# Patient Record
Sex: MALE | Race: BLACK OR AFRICAN AMERICAN | NOT HISPANIC OR LATINO | URBAN - METROPOLITAN AREA
[De-identification: names, ages, dates, MRNs, and addresses within clinical notes are randomized per-mention and may not be internally consistent; named-entity substitution may affect disease eponyms.]

---

## 2022-04-20 ENCOUNTER — OFFICE (OUTPATIENT)
Dept: URBAN - METROPOLITAN AREA CLINIC 11 | Facility: CLINIC | Age: 22
End: 2022-04-20

## 2022-04-20 VITALS
OXYGEN SATURATION: 100 % | HEART RATE: 74 BPM | HEIGHT: 66 IN | WEIGHT: 131 LBS | SYSTOLIC BLOOD PRESSURE: 113 MMHG | DIASTOLIC BLOOD PRESSURE: 63 MMHG

## 2022-04-20 DIAGNOSIS — R11.2 NAUSEA WITH VOMITING, UNSPECIFIED: ICD-10-CM

## 2022-04-20 DIAGNOSIS — R10.13 EPIGASTRIC PAIN: ICD-10-CM

## 2022-04-20 LAB
AMYLASE: 28 U/L — LOW (ref 31–110)
CBC, PLATELET, NO DIFFERENTIAL: HEMATOCRIT: 42 % (ref 34–46.6)
CBC, PLATELET, NO DIFFERENTIAL: HEMOGLOBIN: 12.3 G/DL (ref 11.1–15.9)
CBC, PLATELET, NO DIFFERENTIAL: MCH: 22 PG — LOW (ref 26.6–33)
CBC, PLATELET, NO DIFFERENTIAL: MCHC: 29.3 G/DL — LOW (ref 31.5–35.7)
CBC, PLATELET, NO DIFFERENTIAL: MCV: 75 FL — LOW (ref 79–97)
CBC, PLATELET, NO DIFFERENTIAL: PLATELETS: 544 X10E3/UL — HIGH (ref 150–450)
CBC, PLATELET, NO DIFFERENTIAL: RBC: 5.6 X10E6/UL — HIGH (ref 3.77–5.28)
CBC, PLATELET, NO DIFFERENTIAL: RDW: 14.3 % (ref 11.7–15.4)
CBC, PLATELET, NO DIFFERENTIAL: WBC: 8.4 X10E3/UL (ref 3.4–10.8)
COMP. METABOLIC PANEL (14): A/G RATIO: 1.4 (ref 1.2–2.2)
COMP. METABOLIC PANEL (14): ALBUMIN: 4 G/DL (ref 3.9–5)
COMP. METABOLIC PANEL (14): ALKALINE PHOSPHATASE: 94 IU/L (ref 44–121)
COMP. METABOLIC PANEL (14): ALT (SGPT): 10 IU/L (ref 0–32)
COMP. METABOLIC PANEL (14): AST (SGOT): 12 IU/L (ref 0–40)
COMP. METABOLIC PANEL (14): BILIRUBIN, TOTAL: 0.5 MG/DL (ref 0–1.2)
COMP. METABOLIC PANEL (14): BUN/CREATININE RATIO: 10 (ref 9–23)
COMP. METABOLIC PANEL (14): BUN: 9 MG/DL (ref 6–20)
COMP. METABOLIC PANEL (14): CALCIUM: 9.7 MG/DL (ref 8.7–10.2)
COMP. METABOLIC PANEL (14): CARBON DIOXIDE, TOTAL: 27 MMOL/L (ref 20–29)
COMP. METABOLIC PANEL (14): CHLORIDE: 98 MMOL/L (ref 96–106)
COMP. METABOLIC PANEL (14): CREATININE: 0.89 MG/DL (ref 0.57–1)
COMP. METABOLIC PANEL (14): EGFR: 95 ML/MIN/1.73 (ref 59–?)
COMP. METABOLIC PANEL (14): GLOBULIN, TOTAL: 2.9 G/DL (ref 1.5–4.5)
COMP. METABOLIC PANEL (14): GLUCOSE: 81 MG/DL (ref 65–99)
COMP. METABOLIC PANEL (14): POTASSIUM: 4.5 MMOL/L (ref 3.5–5.2)
COMP. METABOLIC PANEL (14): PROTEIN, TOTAL: 6.9 G/DL (ref 6–8.5)
COMP. METABOLIC PANEL (14): SODIUM: 140 MMOL/L (ref 134–144)
ENDOMYSIAL ANTIBODY IGA: NEGATIVE
H PYLORI BREATH TEST: NEGATIVE
H. PYLORI BREATH COLLECTION: (no result)
IMMUNOGLOBULIN A, QN, SERUM: 384 MG/DL — HIGH (ref 87–352)
LIPASE: 22 U/L (ref 14–72)
T-TRANSGLUTAMINASE (TTG) IGA: <2 U/ML

## 2022-04-20 PROCEDURE — 99204 OFFICE O/P NEW MOD 45 MIN: CPT | Performed by: INTERNAL MEDICINE

## 2022-04-20 RX ORDER — OMEPRAZOLE 40 MG/1
40 CAPSULE, DELAYED RELEASE ORAL
Qty: 30 | Refills: 11 | Status: ACTIVE
Start: 2022-04-20

## 2022-04-20 NOTE — SERVICEHPINOTES
Mr. Emanuel is a 22-year-old man here for evaluation of upper abdominal discomfort, nausea, vomiting. The patient states his symptoms started at the beginning of 2022 when he states that when he would eat anything it would cause twisting and turning of his stomach as well as epigastric discomfort associated with nausea and vomiting. Usually occur within 30 minutes of eating. He states that he did see clinic and was given an unknown medication and that his symptoms did eventually get better but then it returned in early April 2022. He states he is not having similar symptoms. He states that if he eats a small amount of food he only has mild symptoms but he has a large amount of food he will then have more nausea with vomiting. He does state that his weight is overall stable. He has been having some associated cups. He denies any family history of gallbladder issues, colon cancer, or colon polyps. He has not been taking a PPI. He will rarely take NSAIDs. He has been given ondansetron recently by his clinic which he states has only helped minimally. He denies any tobacco use, alcohol use, or marijuana use. He denies any fevers or chills. He denies any recent travel or antibiotics over the past few months.

## 2022-04-20 NOTE — SERVICENOTES
The patient has been having some more symptoms over the past few months which did improve for short time and then has since returned.  Because of his persistent symptoms over this time I do think it is reasonable that he undergo evaluation as above with blood work, ultrasound, H pylori breath test, and EGD.  The differential does also include H pylori gastritis, peptic ulcer disease, inflammatory bowel disease, infection, gallbladder abnormality, pancreatitis, neoplasm, etc.  If the above workup is unremarkable and symptoms do persist then he may require cross-sectional abdominal imaging.

## 2022-05-06 ENCOUNTER — AMBULATORY SURGICAL CENTER (OUTPATIENT)
Dept: URBAN - METROPOLITAN AREA SURGERY 3 | Facility: SURGERY | Age: 22
End: 2022-05-06

## 2022-05-06 ENCOUNTER — OFFICE (OUTPATIENT)
Dept: URBAN - METROPOLITAN AREA PATHOLOGY 22 | Facility: PATHOLOGY | Age: 22
End: 2022-05-06

## 2022-05-06 ENCOUNTER — INPATIENT HOSPITAL (OUTPATIENT)
Dept: URBAN - METROPOLITAN AREA HOSPITAL 130 | Facility: HOSPITAL | Age: 22
End: 2022-05-06

## 2022-05-06 VITALS
OXYGEN SATURATION: 96 % | SYSTOLIC BLOOD PRESSURE: 113 MMHG | WEIGHT: 115 LBS | HEART RATE: 82 BPM | SYSTOLIC BLOOD PRESSURE: 113 MMHG | DIASTOLIC BLOOD PRESSURE: 63 MMHG | HEIGHT: 66 IN | DIASTOLIC BLOOD PRESSURE: 65 MMHG | DIASTOLIC BLOOD PRESSURE: 79 MMHG | DIASTOLIC BLOOD PRESSURE: 63 MMHG | RESPIRATION RATE: 14 BRPM | HEART RATE: 85 BPM | HEART RATE: 76 BPM | DIASTOLIC BLOOD PRESSURE: 79 MMHG | SYSTOLIC BLOOD PRESSURE: 94 MMHG | RESPIRATION RATE: 15 BRPM | DIASTOLIC BLOOD PRESSURE: 62 MMHG | DIASTOLIC BLOOD PRESSURE: 62 MMHG | HEART RATE: 85 BPM | HEART RATE: 88 BPM | OXYGEN SATURATION: 96 % | SYSTOLIC BLOOD PRESSURE: 97 MMHG | DIASTOLIC BLOOD PRESSURE: 53 MMHG | DIASTOLIC BLOOD PRESSURE: 66 MMHG | HEART RATE: 82 BPM | HEART RATE: 83 BPM | TEMPERATURE: 98 F | DIASTOLIC BLOOD PRESSURE: 50 MMHG | DIASTOLIC BLOOD PRESSURE: 68 MMHG | SYSTOLIC BLOOD PRESSURE: 97 MMHG | HEART RATE: 74 BPM | HEART RATE: 88 BPM | HEART RATE: 88 BPM | DIASTOLIC BLOOD PRESSURE: 65 MMHG | SYSTOLIC BLOOD PRESSURE: 104 MMHG | SYSTOLIC BLOOD PRESSURE: 97 MMHG | RESPIRATION RATE: 14 BRPM | HEART RATE: 83 BPM | OXYGEN SATURATION: 98 % | OXYGEN SATURATION: 97 % | HEART RATE: 89 BPM | DIASTOLIC BLOOD PRESSURE: 53 MMHG | SYSTOLIC BLOOD PRESSURE: 110 MMHG | RESPIRATION RATE: 14 BRPM | SYSTOLIC BLOOD PRESSURE: 94 MMHG | DIASTOLIC BLOOD PRESSURE: 53 MMHG | DIASTOLIC BLOOD PRESSURE: 63 MMHG | HEART RATE: 89 BPM | OXYGEN SATURATION: 98 % | HEART RATE: 82 BPM | RESPIRATION RATE: 16 BRPM | SYSTOLIC BLOOD PRESSURE: 110 MMHG | RESPIRATION RATE: 19 BRPM | SYSTOLIC BLOOD PRESSURE: 132 MMHG | RESPIRATION RATE: 19 BRPM | HEIGHT: 66 IN | RESPIRATION RATE: 16 BRPM | SYSTOLIC BLOOD PRESSURE: 100 MMHG | TEMPERATURE: 97.6 F | DIASTOLIC BLOOD PRESSURE: 62 MMHG | SYSTOLIC BLOOD PRESSURE: 102 MMHG | DIASTOLIC BLOOD PRESSURE: 60 MMHG | DIASTOLIC BLOOD PRESSURE: 66 MMHG | DIASTOLIC BLOOD PRESSURE: 68 MMHG | DIASTOLIC BLOOD PRESSURE: 60 MMHG | WEIGHT: 115 LBS | TEMPERATURE: 98 F | HEART RATE: 83 BPM | SYSTOLIC BLOOD PRESSURE: 100 MMHG | TEMPERATURE: 98 F | DIASTOLIC BLOOD PRESSURE: 79 MMHG | DIASTOLIC BLOOD PRESSURE: 66 MMHG | HEART RATE: 76 BPM | HEIGHT: 66 IN | HEART RATE: 96 BPM | RESPIRATION RATE: 15 BRPM | HEART RATE: 96 BPM | RESPIRATION RATE: 15 BRPM | SYSTOLIC BLOOD PRESSURE: 94 MMHG | SYSTOLIC BLOOD PRESSURE: 110 MMHG | HEART RATE: 76 BPM | HEART RATE: 74 BPM | RESPIRATION RATE: 16 BRPM | SYSTOLIC BLOOD PRESSURE: 102 MMHG | WEIGHT: 115 LBS | DIASTOLIC BLOOD PRESSURE: 50 MMHG | SYSTOLIC BLOOD PRESSURE: 107 MMHG | SYSTOLIC BLOOD PRESSURE: 107 MMHG | RESPIRATION RATE: 19 BRPM | SYSTOLIC BLOOD PRESSURE: 132 MMHG | TEMPERATURE: 97.6 F | DIASTOLIC BLOOD PRESSURE: 65 MMHG | OXYGEN SATURATION: 98 % | SYSTOLIC BLOOD PRESSURE: 102 MMHG | HEART RATE: 96 BPM | SYSTOLIC BLOOD PRESSURE: 100 MMHG | OXYGEN SATURATION: 97 % | DIASTOLIC BLOOD PRESSURE: 60 MMHG | DIASTOLIC BLOOD PRESSURE: 68 MMHG | SYSTOLIC BLOOD PRESSURE: 113 MMHG | SYSTOLIC BLOOD PRESSURE: 104 MMHG | HEART RATE: 74 BPM | OXYGEN SATURATION: 96 % | HEART RATE: 85 BPM | OXYGEN SATURATION: 97 % | SYSTOLIC BLOOD PRESSURE: 132 MMHG | TEMPERATURE: 97.6 F | HEART RATE: 89 BPM | SYSTOLIC BLOOD PRESSURE: 104 MMHG | SYSTOLIC BLOOD PRESSURE: 107 MMHG | DIASTOLIC BLOOD PRESSURE: 50 MMHG

## 2022-05-06 DIAGNOSIS — K25.9 GASTRIC ULCER, UNSPECIFIED AS ACUTE OR CHRONIC, WITHOUT HEMO: ICD-10-CM

## 2022-05-06 DIAGNOSIS — K29.50 UNSPECIFIED CHRONIC GASTRITIS WITHOUT BLEEDING: ICD-10-CM

## 2022-05-06 DIAGNOSIS — R63.4 ABNORMAL WEIGHT LOSS: ICD-10-CM

## 2022-05-06 DIAGNOSIS — K31.1 ADULT HYPERTROPHIC PYLORIC STENOSIS: ICD-10-CM

## 2022-05-06 PROBLEM — K31.89 OTHER DISEASES OF STOMACH AND DUODENUM: Status: ACTIVE | Noted: 2022-05-06

## 2022-05-06 PROCEDURE — 88342 IMHCHEM/IMCYTCHM 1ST ANTB: CPT | Mod: 59 | Performed by: PATHOLOGY

## 2022-05-06 PROCEDURE — 99253 IP/OBS CNSLTJ NEW/EST LOW 45: CPT | Performed by: INTERNAL MEDICINE

## 2022-05-06 PROCEDURE — 43239 EGD BIOPSY SINGLE/MULTIPLE: CPT | Performed by: INTERNAL MEDICINE

## 2022-05-06 PROCEDURE — 88341 IMHCHEM/IMCYTCHM EA ADD ANTB: CPT | Performed by: PATHOLOGY

## 2022-05-06 PROCEDURE — 88305 TISSUE EXAM BY PATHOLOGIST: CPT | Performed by: PATHOLOGY

## 2022-05-06 PROCEDURE — 88313 SPECIAL STAINS GROUP 2: CPT | Performed by: PATHOLOGY

## 2022-05-07 ENCOUNTER — INPATIENT HOSPITAL (OUTPATIENT)
Dept: URBAN - METROPOLITAN AREA HOSPITAL 130 | Facility: HOSPITAL | Age: 22
End: 2022-05-07

## 2022-05-07 DIAGNOSIS — K25.9 GASTRIC ULCER, UNSPECIFIED AS ACUTE OR CHRONIC, WITHOUT HEMO: ICD-10-CM

## 2022-05-07 DIAGNOSIS — K31.1 ADULT HYPERTROPHIC PYLORIC STENOSIS: ICD-10-CM

## 2022-05-07 DIAGNOSIS — R63.4 ABNORMAL WEIGHT LOSS: ICD-10-CM

## 2022-05-07 PROCEDURE — 99232 SBSQ HOSP IP/OBS MODERATE 35: CPT | Performed by: INTERNAL MEDICINE

## 2022-05-08 ENCOUNTER — INPATIENT HOSPITAL (OUTPATIENT)
Dept: URBAN - METROPOLITAN AREA HOSPITAL 130 | Facility: HOSPITAL | Age: 22
End: 2022-05-08

## 2022-05-08 DIAGNOSIS — K25.9 GASTRIC ULCER, UNSPECIFIED AS ACUTE OR CHRONIC, WITHOUT HEMO: ICD-10-CM

## 2022-05-08 DIAGNOSIS — R63.4 ABNORMAL WEIGHT LOSS: ICD-10-CM

## 2022-05-08 DIAGNOSIS — K31.1 ADULT HYPERTROPHIC PYLORIC STENOSIS: ICD-10-CM

## 2022-05-08 PROCEDURE — 99232 SBSQ HOSP IP/OBS MODERATE 35: CPT | Performed by: INTERNAL MEDICINE

## 2022-05-09 ENCOUNTER — INPATIENT HOSPITAL (OUTPATIENT)
Dept: URBAN - METROPOLITAN AREA HOSPITAL 130 | Facility: HOSPITAL | Age: 22
End: 2022-05-09

## 2022-05-09 DIAGNOSIS — R63.4 ABNORMAL WEIGHT LOSS: ICD-10-CM

## 2022-05-09 DIAGNOSIS — K25.9 GASTRIC ULCER, UNSPECIFIED AS ACUTE OR CHRONIC, WITHOUT HEMO: ICD-10-CM

## 2022-05-09 DIAGNOSIS — K31.1 ADULT HYPERTROPHIC PYLORIC STENOSIS: ICD-10-CM

## 2022-05-09 DIAGNOSIS — K26.9 DUODENAL ULCER, UNSPECIFIED AS ACUTE OR CHRONIC, WITHOUT HEM: ICD-10-CM

## 2022-05-09 PROCEDURE — 43239 EGD BIOPSY SINGLE/MULTIPLE: CPT | Performed by: INTERNAL MEDICINE

## 2022-05-10 ENCOUNTER — INPATIENT HOSPITAL (OUTPATIENT)
Dept: URBAN - METROPOLITAN AREA HOSPITAL 130 | Facility: HOSPITAL | Age: 22
End: 2022-05-10

## 2022-05-10 DIAGNOSIS — R63.4 ABNORMAL WEIGHT LOSS: ICD-10-CM

## 2022-05-10 DIAGNOSIS — K31.1 ADULT HYPERTROPHIC PYLORIC STENOSIS: ICD-10-CM

## 2022-05-10 DIAGNOSIS — K25.9 GASTRIC ULCER, UNSPECIFIED AS ACUTE OR CHRONIC, WITHOUT HEMO: ICD-10-CM

## 2022-05-10 PROCEDURE — 99232 SBSQ HOSP IP/OBS MODERATE 35: CPT | Performed by: INTERNAL MEDICINE

## 2022-05-17 ENCOUNTER — OFFICE (OUTPATIENT)
Dept: URBAN - METROPOLITAN AREA CLINIC 12 | Facility: CLINIC | Age: 22
End: 2022-05-17

## 2022-05-17 VITALS
HEART RATE: 80 BPM | WEIGHT: 124 LBS | HEIGHT: 66 IN | DIASTOLIC BLOOD PRESSURE: 78 MMHG | OXYGEN SATURATION: 96 % | SYSTOLIC BLOOD PRESSURE: 123 MMHG

## 2022-05-17 DIAGNOSIS — K25.9 GASTRIC ULCER, UNSPECIFIED AS ACUTE OR CHRONIC, WITHOUT HEMO: ICD-10-CM

## 2022-05-17 DIAGNOSIS — R10.13 EPIGASTRIC PAIN: ICD-10-CM

## 2022-05-17 DIAGNOSIS — R11.2 NAUSEA WITH VOMITING, UNSPECIFIED: ICD-10-CM

## 2022-05-17 DIAGNOSIS — K31.5 OBSTRUCTION OF DUODENUM: ICD-10-CM

## 2022-05-17 LAB
C-REACTIVE PROTEIN, QUANT: 15 MG/L — HIGH (ref 0–10)
CBC, PLATELET, NO DIFFERENTIAL: HEMATOCRIT: 36.8 % — LOW (ref 37.5–51)
CBC, PLATELET, NO DIFFERENTIAL: HEMOGLOBIN: 10.4 G/DL — LOW (ref 13–17.7)
CBC, PLATELET, NO DIFFERENTIAL: MCH: 21.6 PG — LOW (ref 26.6–33)
CBC, PLATELET, NO DIFFERENTIAL: MCHC: 28.3 G/DL — LOW (ref 31.5–35.7)
CBC, PLATELET, NO DIFFERENTIAL: MCV: 77 FL — LOW (ref 79–97)
CBC, PLATELET, NO DIFFERENTIAL: PLATELETS: 353 X10E3/UL (ref 150–450)
CBC, PLATELET, NO DIFFERENTIAL: RBC: 4.81 X10E6/UL (ref 4.14–5.8)
CBC, PLATELET, NO DIFFERENTIAL: RDW: 16.4 % — HIGH (ref 11.6–15.4)
CBC, PLATELET, NO DIFFERENTIAL: WBC: 7.8 X10E3/UL (ref 3.4–10.8)
COMP. METABOLIC PANEL (14): A/G RATIO: 1.5 (ref 1.2–2.2)
COMP. METABOLIC PANEL (14): ALBUMIN: 4.1 G/DL (ref 4.1–5.2)
COMP. METABOLIC PANEL (14): ALKALINE PHOSPHATASE: 74 IU/L (ref 44–121)
COMP. METABOLIC PANEL (14): ALT (SGPT): 10 IU/L (ref 0–44)
COMP. METABOLIC PANEL (14): AST (SGOT): 10 IU/L (ref 0–40)
COMP. METABOLIC PANEL (14): BILIRUBIN, TOTAL: 0.4 MG/DL (ref 0–1.2)
COMP. METABOLIC PANEL (14): BUN/CREATININE RATIO: 10 (ref 9–20)
COMP. METABOLIC PANEL (14): BUN: 10 MG/DL (ref 6–20)
COMP. METABOLIC PANEL (14): CALCIUM: 9.3 MG/DL (ref 8.7–10.2)
COMP. METABOLIC PANEL (14): CARBON DIOXIDE, TOTAL: 26 MMOL/L (ref 20–29)
COMP. METABOLIC PANEL (14): CHLORIDE: 96 MMOL/L (ref 96–106)
COMP. METABOLIC PANEL (14): CREATININE: 1.01 MG/DL (ref 0.76–1.27)
COMP. METABOLIC PANEL (14): EGFR: 109 ML/MIN/1.73 (ref 59–?)
COMP. METABOLIC PANEL (14): GLOBULIN, TOTAL: 2.8 G/DL (ref 1.5–4.5)
COMP. METABOLIC PANEL (14): GLUCOSE: 84 MG/DL (ref 65–99)
COMP. METABOLIC PANEL (14): POTASSIUM: 4 MMOL/L (ref 3.5–5.2)
COMP. METABOLIC PANEL (14): PROTEIN, TOTAL: 6.9 G/DL (ref 6–8.5)
COMP. METABOLIC PANEL (14): SODIUM: 138 MMOL/L (ref 134–144)
IBD EXPANDED PANEL: ACCA: 55 UNITS (ref 0–90)
IBD EXPANDED PANEL: ALCA: 142 UNITS — HIGH (ref 0–60)
IBD EXPANDED PANEL: AMCA: 273 UNITS — HIGH (ref 0–100)
IBD EXPANDED PANEL: ATYPICAL PANCA: NEGATIVE
IBD EXPANDED PANEL: COMMENTS: ABNORMAL
IBD EXPANDED PANEL: GASCA: 138 UNITS — HIGH (ref 0–50)
SEDIMENTATION RATE-WESTERGREN: 55 MM/HR — HIGH (ref 0–15)

## 2022-05-17 PROCEDURE — 99214 OFFICE O/P EST MOD 30 MIN: CPT | Performed by: INTERNAL MEDICINE

## 2022-05-17 RX ORDER — OMEPRAZOLE 40 MG/1
80 CAPSULE, DELAYED RELEASE ORAL
Qty: 60 | Refills: 3 | Status: COMPLETED
Start: 2022-05-17 | End: 2022-06-17 | Stop reason: SDUPTHER

## 2022-05-17 RX ORDER — SUCRALFATE 1 G/10ML
SUSPENSION ORAL
Qty: 1200 | Refills: 3 | Status: ACTIVE
Start: 2022-05-17

## 2022-05-17 NOTE — SERVICENOTES
I had a long discussion with the patient and his father today.  I explained that the cause of his significant inflammation is unknown and that there was significant stenosis of the duodenum through which a regular scope could not be passed only a pediatric scope could be passed.  He does appear to have significant improvement in his symptoms and is able to tolerate liquid to soft food which could indicate that his PPI and sucralfate are working well.  This could be due to severe peptic ulcer disease.  Gastrinoma appears less likely given normal gastrin level.  Crohn's disease is also in the differential but this is exceedingly rare to initially involved this aspect of the GI tract.  We will check IBD panel.  Also recommend repeating EGD in one month after having long-term high-dose PPI therapy to see if we can have improvement of symptoms and improvement of his stenosis.  If symptoms do worsen in the meantime to where he has more evidence of more high-grade stenosis or stricture with recurrent gastric outlet obstruction then he may also need hospitalization and  surgical diversion.  At this point, given no definite Crohn's disease in the fact that this would be an atypical presentation I will hold off on steroids.  At some point he may benefit from colonoscopy, however there is no evidence of any inflammation throughout the colon or rest of the small intestine so this exam would likely have lower yield.

## 2022-05-17 NOTE — SERVICEHPINOTES
Mr. Emanuel is a 22-year-old man here for follow-up. The patient was initially seen by me in April 2022 because of upper abdominal pain, nausea, and vomiting which started at the beginning of 2022 and was only mild but then became worse starting April 2022. Because of this we placed him on PPI and check basic labs which were all unremarkable except for elevation of his platelets. H pylori breath test was negative. He then came in for colonoscopy about two weeks later and had about 15 lb of weight loss over that time frame with persistent worsening of his nausea vomiting the point where he could keep almost nothing down. EGD was performed which revealed severe diffuse erythema, ulceration, and friability throughout the stomach. Initially there so much inflammation that I thought that he had pyloric stenosis due to ulceration with some other narrowing of the stomach, however it appears, after discussing with Dr. white, who performed EGD in the hospital, that he had inflammation ulceration of the pylorus but that the actual stenosis was in the duodenal bowel, which makes sense. He also had diffuse erythema throughout the rest the stomach. Because of our scope we did recommend he go directly to the hospital as the adult scope could not pass by this strictured area. He underwent CT scan at [facility] which only revealed evidence of gastritis and duodenitis but no evidence of any inflammation elsewhere. He was seen by surgery in case surgery is needed for gastric outlet obstruction with the patient did respond to IV PPI and sucralfate and was tolerating liquid diet and so he was discharged. Of note, gastrin which checked in the hospital and was normal. Biopsies both from here and also at the hospital only revealed evidence of marked had chronic inflammation and granulation tissue. Stains were negative for H pylori as well as lymphoma. Pathology from the hospital EGD again revealed chronic gastritis with no H pylori. He states that he has done well since he has been discharge and is tolerating liquids and soft foods. Sometimes if he eats something more solid or lot of food will lead to some nausea and belching but he has not had much in way of vomiting since then. He has gained about 10 lb since we saw him for his EGD. He continues to take omeprazole twice a day as well as what sounds like sucralfate 3 times a day. He has less abdominal pain. There is no family history of inflammatory bowel disease.

## 2022-05-25 ENCOUNTER — INPATIENT HOSPITAL (OUTPATIENT)
Dept: URBAN - METROPOLITAN AREA HOSPITAL 130 | Facility: HOSPITAL | Age: 22
End: 2022-05-25
Payer: COMMERCIAL

## 2022-05-25 DIAGNOSIS — R63.4 ABNORMAL WEIGHT LOSS: ICD-10-CM

## 2022-05-25 DIAGNOSIS — K25.9 GASTRIC ULCER, UNSPECIFIED AS ACUTE OR CHRONIC, WITHOUT HEMO: ICD-10-CM

## 2022-05-25 DIAGNOSIS — K50.90 CROHN'S DISEASE, UNSPECIFIED, WITHOUT COMPLICATIONS: ICD-10-CM

## 2022-05-25 DIAGNOSIS — R10.84 GENERALIZED ABDOMINAL PAIN: ICD-10-CM

## 2022-05-25 PROCEDURE — 99254 IP/OBS CNSLTJ NEW/EST MOD 60: CPT | Performed by: INTERNAL MEDICINE

## 2022-05-26 ENCOUNTER — INPATIENT HOSPITAL (OUTPATIENT)
Dept: URBAN - METROPOLITAN AREA HOSPITAL 130 | Facility: HOSPITAL | Age: 22
End: 2022-05-26
Payer: COMMERCIAL

## 2022-05-26 DIAGNOSIS — K50.90 CROHN'S DISEASE, UNSPECIFIED, WITHOUT COMPLICATIONS: ICD-10-CM

## 2022-05-26 DIAGNOSIS — K64.8 OTHER HEMORRHOIDS: ICD-10-CM

## 2022-05-26 DIAGNOSIS — R63.4 ABNORMAL WEIGHT LOSS: ICD-10-CM

## 2022-05-26 DIAGNOSIS — D12.0 BENIGN NEOPLASM OF CECUM: ICD-10-CM

## 2022-05-26 PROCEDURE — 45380 COLONOSCOPY AND BIOPSY: CPT | Performed by: INTERNAL MEDICINE

## 2022-06-17 ENCOUNTER — AMBULATORY SURGICAL CENTER (OUTPATIENT)
Dept: URBAN - METROPOLITAN AREA SURGERY 3 | Facility: SURGERY | Age: 22
End: 2022-06-17
Payer: COMMERCIAL

## 2022-06-17 VITALS
SYSTOLIC BLOOD PRESSURE: 123 MMHG | SYSTOLIC BLOOD PRESSURE: 106 MMHG | DIASTOLIC BLOOD PRESSURE: 73 MMHG | RESPIRATION RATE: 19 BRPM | HEART RATE: 80 BPM | RESPIRATION RATE: 16 BRPM | SYSTOLIC BLOOD PRESSURE: 120 MMHG | SYSTOLIC BLOOD PRESSURE: 106 MMHG | DIASTOLIC BLOOD PRESSURE: 78 MMHG | SYSTOLIC BLOOD PRESSURE: 127 MMHG | DIASTOLIC BLOOD PRESSURE: 77 MMHG | HEART RATE: 81 BPM | HEART RATE: 82 BPM | DIASTOLIC BLOOD PRESSURE: 65 MMHG | HEART RATE: 82 BPM | HEIGHT: 66 IN | OXYGEN SATURATION: 96 % | SYSTOLIC BLOOD PRESSURE: 123 MMHG | HEART RATE: 75 BPM | RESPIRATION RATE: 19 BRPM | HEART RATE: 81 BPM | OXYGEN SATURATION: 96 % | WEIGHT: 120 LBS | OXYGEN SATURATION: 97 % | RESPIRATION RATE: 16 BRPM | HEART RATE: 80 BPM | HEART RATE: 70 BPM | TEMPERATURE: 97.8 F | DIASTOLIC BLOOD PRESSURE: 65 MMHG | TEMPERATURE: 98.4 F | WEIGHT: 120 LBS | HEIGHT: 66 IN | RESPIRATION RATE: 16 BRPM | DIASTOLIC BLOOD PRESSURE: 77 MMHG | DIASTOLIC BLOOD PRESSURE: 70 MMHG | HEIGHT: 66 IN | SYSTOLIC BLOOD PRESSURE: 120 MMHG | HEART RATE: 75 BPM | HEART RATE: 70 BPM | OXYGEN SATURATION: 97 % | OXYGEN SATURATION: 97 % | TEMPERATURE: 97.8 F | WEIGHT: 120 LBS | DIASTOLIC BLOOD PRESSURE: 70 MMHG | RESPIRATION RATE: 15 BRPM | SYSTOLIC BLOOD PRESSURE: 106 MMHG | DIASTOLIC BLOOD PRESSURE: 78 MMHG | RESPIRATION RATE: 15 BRPM | OXYGEN SATURATION: 96 % | SYSTOLIC BLOOD PRESSURE: 127 MMHG | RESPIRATION RATE: 19 BRPM | OXYGEN SATURATION: 98 % | SYSTOLIC BLOOD PRESSURE: 127 MMHG | SYSTOLIC BLOOD PRESSURE: 120 MMHG | TEMPERATURE: 97.8 F | DIASTOLIC BLOOD PRESSURE: 73 MMHG | DIASTOLIC BLOOD PRESSURE: 77 MMHG | RESPIRATION RATE: 15 BRPM | TEMPERATURE: 98.4 F | DIASTOLIC BLOOD PRESSURE: 65 MMHG | SYSTOLIC BLOOD PRESSURE: 123 MMHG | DIASTOLIC BLOOD PRESSURE: 70 MMHG | HEART RATE: 70 BPM | OXYGEN SATURATION: 98 % | DIASTOLIC BLOOD PRESSURE: 73 MMHG | HEART RATE: 80 BPM | HEART RATE: 82 BPM | DIASTOLIC BLOOD PRESSURE: 78 MMHG | HEART RATE: 75 BPM | OXYGEN SATURATION: 98 % | TEMPERATURE: 98.4 F | HEART RATE: 81 BPM

## 2022-06-17 DIAGNOSIS — K31.5 OBSTRUCTION OF DUODENUM: ICD-10-CM

## 2022-06-17 DIAGNOSIS — T18.2XXA FOREIGN BODY IN STOMACH, INITIAL ENCOUNTER: ICD-10-CM

## 2022-06-17 DIAGNOSIS — K22.89 OTHER SPECIFIED DISEASE OF ESOPHAGUS: ICD-10-CM

## 2022-06-17 PROCEDURE — 43235 EGD DIAGNOSTIC BRUSH WASH: CPT | Performed by: INTERNAL MEDICINE

## 2022-06-21 ENCOUNTER — OFFICE (OUTPATIENT)
Dept: URBAN - METROPOLITAN AREA CLINIC 12 | Facility: CLINIC | Age: 22
End: 2022-06-21
Payer: COMMERCIAL

## 2022-06-21 VITALS
HEART RATE: 71 BPM | DIASTOLIC BLOOD PRESSURE: 67 MMHG | WEIGHT: 123 LBS | SYSTOLIC BLOOD PRESSURE: 112 MMHG | OXYGEN SATURATION: 95 % | HEIGHT: 66 IN

## 2022-06-21 DIAGNOSIS — K31.5 OBSTRUCTION OF DUODENUM: ICD-10-CM

## 2022-06-21 DIAGNOSIS — K50.90 CROHN'S DISEASE, UNSPECIFIED, WITHOUT COMPLICATIONS: ICD-10-CM

## 2022-06-21 DIAGNOSIS — K25.9 GASTRIC ULCER, UNSPECIFIED AS ACUTE OR CHRONIC, WITHOUT HEMO: ICD-10-CM

## 2022-06-21 LAB
C-REACTIVE PROTEIN, QUANT: <1 MG/L
CBC, PLATELET, NO DIFFERENTIAL: HEMATOCRIT: 41.9 % (ref 37.5–51)
CBC, PLATELET, NO DIFFERENTIAL: HEMOGLOBIN: 12 G/DL — LOW (ref 13–17.7)
CBC, PLATELET, NO DIFFERENTIAL: MCH: 24.1 PG — LOW (ref 26.6–33)
CBC, PLATELET, NO DIFFERENTIAL: MCHC: 28.6 G/DL — LOW (ref 31.5–35.7)
CBC, PLATELET, NO DIFFERENTIAL: MCV: 84 FL (ref 79–97)
CBC, PLATELET, NO DIFFERENTIAL: PLATELETS: 258 X10E3/UL (ref 150–450)
CBC, PLATELET, NO DIFFERENTIAL: RBC: 4.98 X10E6/UL (ref 4.14–5.8)
CBC, PLATELET, NO DIFFERENTIAL: RDW: 18.1 % — HIGH (ref 11.6–15.4)
CBC, PLATELET, NO DIFFERENTIAL: WBC: 8.6 X10E3/UL (ref 3.4–10.8)
COMP. METABOLIC PANEL (14): A/G RATIO: 1.9 (ref 1.2–2.2)
COMP. METABOLIC PANEL (14): ALBUMIN: 4.1 G/DL (ref 4.1–5.2)
COMP. METABOLIC PANEL (14): ALKALINE PHOSPHATASE: 54 IU/L (ref 44–121)
COMP. METABOLIC PANEL (14): ALT (SGPT): 10 IU/L (ref 0–44)
COMP. METABOLIC PANEL (14): AST (SGOT): 8 IU/L (ref 0–40)
COMP. METABOLIC PANEL (14): BILIRUBIN, TOTAL: 0.3 MG/DL (ref 0–1.2)
COMP. METABOLIC PANEL (14): BUN/CREATININE RATIO: 16 (ref 9–20)
COMP. METABOLIC PANEL (14): BUN: 11 MG/DL (ref 6–20)
COMP. METABOLIC PANEL (14): CALCIUM: 9.5 MG/DL (ref 8.7–10.2)
COMP. METABOLIC PANEL (14): CARBON DIOXIDE, TOTAL: 28 MMOL/L (ref 20–29)
COMP. METABOLIC PANEL (14): CHLORIDE: 102 MMOL/L (ref 96–106)
COMP. METABOLIC PANEL (14): CREATININE: 0.69 MG/DL — LOW (ref 0.76–1.27)
COMP. METABOLIC PANEL (14): EGFR: 135 ML/MIN/1.73 (ref 59–?)
COMP. METABOLIC PANEL (14): GLOBULIN, TOTAL: 2.2 G/DL (ref 1.5–4.5)
COMP. METABOLIC PANEL (14): GLUCOSE: 86 MG/DL (ref 65–99)
COMP. METABOLIC PANEL (14): POTASSIUM: 3.7 MMOL/L (ref 3.5–5.2)
COMP. METABOLIC PANEL (14): PROTEIN, TOTAL: 6.3 G/DL (ref 6–8.5)
COMP. METABOLIC PANEL (14): SODIUM: 143 MMOL/L (ref 134–144)
HBSAG SCREEN: NEGATIVE
HEP B CORE AB, TOT: NEGATIVE
HEP B SURFACE AB: HEP B SURFACE AB, QUAL: NON REACTIVE
QUANTIFERON-TB GOLD PLUS: NEGATIVE
QUANTIFERON-TB GOLD PLUS: QUANTIFERON CRITERIA: (no result)
QUANTIFERON-TB GOLD PLUS: QUANTIFERON INCUBATION: (no result)
QUANTIFERON-TB GOLD PLUS: QUANTIFERON MITOGEN VALUE: >10 IU/ML
QUANTIFERON-TB GOLD PLUS: QUANTIFERON NIL VALUE: 0.09 IU/ML
QUANTIFERON-TB GOLD PLUS: QUANTIFERON TB1 AG VALUE: 0.06 IU/ML
QUANTIFERON-TB GOLD PLUS: QUANTIFERON TB2 AG VALUE: 0.07 IU/ML
SEDIMENTATION RATE-WESTERGREN: 5 MM/HR (ref 0–15)
THIOPURINE METHYLTRANSFERASE: INTERPRETATION: (no result)
THIOPURINE METHYLTRANSFERASE: METHODOLOGY: (no result)
THIOPURINE METHYLTRANSFERASE: TPMT ACTIVITY: 29.9 UNITS/ML RBC

## 2022-06-21 PROCEDURE — 99213 OFFICE O/P EST LOW 20 MIN: CPT | Performed by: INTERNAL MEDICINE

## 2022-06-21 NOTE — INTERFACERESULTNOTES
Await word from patient and family in regard to whether not they want to proceed with biologic therapy.

## 2022-06-21 NOTE — SERVICEHPINOTES
Mr. Emanuel is a 22-year-old man here for follow-up. When we saw the patient in the middle of May 2022 he was doing relatively well but then a few days later had worsening symptoms and so went back to the hospital where he was seen by one of my associates and was started on prednisone. Of note, when we saw him in clinic in May 2022 his IBD diagnostic panel was significantly elevated with multiple abnormal labs and findings suggestive of Crohn's disease with very high risk of aggressive disease behavior including development of strictures or fistula. The patient underwent colonoscopy while in the hospital which was negative for any evidence of colonic Crohn's disease or disease of the terminal ileum with unremarkable biopsies. He was placed on prednisone and has done well since then. He underwent EGD by me on 6/17/2022 which revealed significant healing of his stomach and duodenal bulb, however there still was significant stricture with inability to pass the regular EGD scope through the duodenal bulb stricture. The patient now comes in for follow-up with his father. He states that he has not had any vomiting episodes for over couple of weeks. He still does have some occasional burping or bloated symptoms whenever he eats certain foods. He states that his weight has overall been stable since his hospitalization and has not been able to gain weight but has not lost any weight since then.
  Previous GI History:
br The patient was initially seen by me in April 2022 because of upper abdominal pain, nausea, and vomiting which started at the beginning of 2022 and was only mild but then became worse starting April 2022. Because of this we placed him on PPI and check basic labs which were all unremarkable except for elevation of his platelets. H pylori breath test was negative. He then came in for colonoscopy about two weeks later and had about 15 lb of weight loss over that time frame with persistent worsening of his nausea vomiting the point where he could keep almost nothing down. EGD was performed which revealed severe diffuse erythema, ulceration, and friability throughout the stomach. Initially there so much inflammation that I thought that he had pyloric stenosis due to ulceration with some other narrowing of the stomach, however it appears, after discussing with Dr. white, who performed EGD in the hospital, that he had inflammation ulceration of the pylorus but that the actual stenosis was in the duodenal bowel, which makes sense. He also had diffuse erythema throughout the rest the stomach. Because of our scope we did recommend he go directly to the hospital as the adult scope could not pass by this strictured area. He underwent CT scan at [facility] which only revealed evidence of gastritis and duodenitis but no evidence of any inflammation elsewhere. He was seen by surgery in case surgery is needed for gastric outlet obstruction with the patient did respond to IV PPI and sucralfate and was tolerating liquid diet and so he was discharged. Of note, gastrin which checked in the hospital and was normal. Biopsies both from here and also at the hospital only revealed evidence of marked had chronic inflammation and granulation tissue. Stains were negative for H pylori as well as lymphoma. Pathology from the hospital EGD again revealed chronic gastritis with no H pylori.

## 2022-06-21 NOTE — SERVICENOTES
The patient is doing well on steroids and has had good response.  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 Because of this I do recommend that he be started on biologic therapy, favoring Remicade given his stricture disease and that we did discuss the possibility of Humira as well.  Also recommended the addition of methotrexate as well. I explained to them that there increased risk with this including risk with neoplasm, rash, and infection and did recommend that he receive all age appropriate vaccines as well as Covid vaccine, pneumonia vaccine, shingles vaccine, etc.  At this point they informed me that he has never had a vaccine and do not plan on doing any further vaccines.  I explained that this would put him at significant risk of severe infection if he were exposed to one of these but they agree and states that they do not plan on vaccination.  They do understand the risk.  I did also explain that this is likely long-term medication that will be lifelong and that if we do stop the medication it does pose a risk of bleeding to return of inflammation and worsening stricturing or possibly even obstruction.  We did discuss the possibility of going straight to gastrojejunostomy but they would like to hold off and will discuss further in regard to medication management.  I recommended they get back in touch with us later this week so we can get the ball rolling on getting medication approved so that we can titrate him off of prednisone but in the meantime recommend he stay on prednisone 10 mg daily until we are able to initiate a plan for maintenance therapy.  I did also offer referral for second opinion as well. In regard to his stricture, it appears to be still significant in the options gastroduodenoscopy versus serial endoscopic dilation.  I did discuss with the risk of perforation with dilation.

## 2022-08-30 ENCOUNTER — OFFICE (OUTPATIENT)
Dept: URBAN - METROPOLITAN AREA CLINIC 12 | Facility: CLINIC | Age: 22
End: 2022-08-30
Payer: COMMERCIAL

## 2022-08-30 VITALS
WEIGHT: 124 LBS | OXYGEN SATURATION: 96 % | HEIGHT: 66 IN | DIASTOLIC BLOOD PRESSURE: 63 MMHG | HEART RATE: 72 BPM | SYSTOLIC BLOOD PRESSURE: 112 MMHG

## 2022-08-30 DIAGNOSIS — K50.90 CROHN'S DISEASE, UNSPECIFIED, WITHOUT COMPLICATIONS: ICD-10-CM

## 2022-08-30 DIAGNOSIS — K31.5 OBSTRUCTION OF DUODENUM: ICD-10-CM

## 2022-08-30 DIAGNOSIS — R11.10 VOMITING, UNSPECIFIED: ICD-10-CM

## 2022-08-30 PROCEDURE — 99214 OFFICE O/P EST MOD 30 MIN: CPT | Performed by: INTERNAL MEDICINE

## 2022-08-30 NOTE — SERVICEHPINOTES
Mr. Emanuel is a 22-year-old man here for follow-up. We last saw the patient in June 2022 he was doing better but still having occasional episode of vomiting. We did have a long discussion with him and his family in regard to treatment of Crohn's disease and I recommended biologic therapy because of his significant stricturing disease and aggressive nature. At that time we discussed other options as well such as mesalamine, Imuran, but these options were not as good. I explained that the other options from the stricture would include surgery but if we can get his inflammation under control we may be able to consider serial dilation. At that time we discussed proceeding with biologic therapy and they wanted to think about it. They initially decided about Skyrzi, however decided to hold off on therapy and try to obtain a second opinion from [facility]. They were never able to get a visit in a close enough time and so now comes back in today stating they are ready to start Skyrizi. Of note, the patient is not vaccinated at all and is not interested in any vaccines whatsoever. He states that he has titrated down on prednisone and now takes 10 mg every other day. He states that he is doing overall well though he does vomit about once a week or so. He has been able to eat more solid food but stays away from soda. His weight is overall about the same.Previous GI History:brThe patient was initially seen by me in April 2022 because of upper abdominal pain, nausea, and vomiting which started at the beginning of 2022 and was only mild but then became worse starting April 2022. Because of this we placed him on PPI and check basic labs which were all unremarkable except for elevation of his platelets. H pylori breath test was negative. He then came in for colonoscopy about two weeks later and had about 15 lb of weight loss over that time frame with persistent worsening of his nausea vomiting the point where he could keep almost nothing down. EGD was performed which revealed severe diffuse erythema, ulceration, and friability throughout the stomach. Initially there so much inflammation that I thought that he had pyloric stenosis due to ulceration with some other narrowing of the stomach, however it appears, after discussing with Dr. white, who performed EGD in the hospital, that he had inflammation ulceration of the pylorus but that the actual stenosis was in the duodenal bowel, which makes sense. He also had diffuse erythema throughout the rest the stomach. Because of our scope we did recommend he go directly to the hospital as the adult scope could not pass by this strictured area. He underwent CT scan at [facility] which only revealed evidence of gastritis and duodenitis but no evidence of any inflammation elsewhere. He was seen by surgery in case surgery is needed for gastric outlet obstruction with the patient did respond to IV PPI and sucralfate and was tolerating liquid diet and so he was discharged. Of note, gastrin which checked in the hospital and was normal. Biopsies both from here and also at the hospital only revealed evidence of marked had chronic inflammation and granulation tissue. Stains were negative for H pylori as well as lymphoma. Pathology from the hospital EGD again revealed chronic gastritis with no H pylori. When we saw the patient in the middle of May 2022 he was doing relatively well but then a few days later had worsening symptoms and so went back to the hospital where he was seen by one of my associates and was started on prednisone. Of note, when we saw him in clinic in May 2022 his IBD diagnostic panel was significantly elevated with multiple abnormal labs and findings suggestive of Crohn's disease with very high risk of aggressive disease behavior including development of strictures or fistula. The patient underwent colonoscopy while in the hospital which was negative for any evidence of colonic Crohn's disease or disease of the terminal ileum with unremarkable biopsies. He was placed on prednisone and has done well since then. He underwent EGD by me on 6/17/2022 which revealed significant healing of his stomach and duodenal bulb, however there still was significant stricture with inability to pass the regular EGD scope through the duodenal bulb stricture.

## 2022-08-30 NOTE — SERVICENOTES
The patient likely has significant aggressive gastro duodenal Crohn's disease complicated with stricturing.  Biopsies have not shown any other etiology outside of possible Crohn's disease so because of this I do recommend proceeding with biologic therapy.  I do agree with Skyrizi as a good option.  Unfortunately, the patient is not vaccinated anything and does not want any vaccines to any diseases or hepatitis-B.  I did explain to the patient that because of the immunosuppressive effects of the medication he is at risk of significant infections which could lead to death, especially hepatitis, however the patient states that he understands this risk and still does not want to undergo any vaccines.  Unfortunate, there are no other good options outside of biologic therapy as long-term prednisone use has its own complications with osteoporosis, endocrine issues, and immune suppression as well. Mesalamine and has azathioprine also has side effects and would likely not be that effective either.  Unfortunately the patient has weighed the risks and benefits of vaccine and has decided to hold off on any vaccines.  He and his family do understand the risk that biologic medications do pose in this case.  Once he is stable on biologic therapy we can start titrating off of prednisone as above and I will plan for EGD in a couple of months to initiate possible dilation of his stricture.

## 2022-09-23 ENCOUNTER — OFFICE (OUTPATIENT)
Dept: URBAN - METROPOLITAN AREA CLINIC 11 | Facility: CLINIC | Age: 22
End: 2022-09-23
Payer: COMMERCIAL

## 2022-09-23 VITALS
DIASTOLIC BLOOD PRESSURE: 65 MMHG | HEIGHT: 66 IN | DIASTOLIC BLOOD PRESSURE: 64 MMHG | SYSTOLIC BLOOD PRESSURE: 100 MMHG | SYSTOLIC BLOOD PRESSURE: 110 MMHG | RESPIRATION RATE: 18 BRPM | SYSTOLIC BLOOD PRESSURE: 105 MMHG | TEMPERATURE: 98.2 F | TEMPERATURE: 97.4 F | DIASTOLIC BLOOD PRESSURE: 60 MMHG | HEART RATE: 71 BPM | HEART RATE: 67 BPM | HEART RATE: 70 BPM | WEIGHT: 124.2 LBS | SYSTOLIC BLOOD PRESSURE: 103 MMHG | DIASTOLIC BLOOD PRESSURE: 67 MMHG

## 2022-09-23 DIAGNOSIS — K50.90 CROHN'S DISEASE, UNSPECIFIED, WITHOUT COMPLICATIONS: ICD-10-CM

## 2022-09-23 PROCEDURE — 96413 CHEMO IV INFUSION 1 HR: CPT | Performed by: INTERNAL MEDICINE

## 2022-09-23 NOTE — SERVICEHPINOTES
See follow up visit from  8/30/2022   .  brDate / Results of last TB test  6/21/2022   -   Negative  brLabs and/or Additional orders: CBC, CMP due in DecemberbrInsurance authorization: Pt has been approved in [employer] for [employer] IV-09/09/22-09/09/24.brPharmacy:  Specialty  brRate of Infusion:  1 hour   
br   Infusion order placed on:  9/6/2022   
br

## 2022-09-23 NOTE — SERVICENOTES
Your next Skyrizi infusion is on  10/21/22  at 1:30pm  .
Patient observed for 15 minutes post infusion. 
Patient denies chest pain, shortness of breath or dizziness.  
Handout given and reviewed with patient.
Patient was instructed on common side effects.
Patient verbalized a complete understanding and has no questions.

## 2022-10-21 ENCOUNTER — OFFICE (OUTPATIENT)
Dept: URBAN - METROPOLITAN AREA CLINIC 11 | Facility: CLINIC | Age: 22
End: 2022-10-21
Payer: COMMERCIAL

## 2022-10-21 VITALS
SYSTOLIC BLOOD PRESSURE: 116 MMHG | SYSTOLIC BLOOD PRESSURE: 115 MMHG | DIASTOLIC BLOOD PRESSURE: 61 MMHG | HEART RATE: 74 BPM | DIASTOLIC BLOOD PRESSURE: 68 MMHG | DIASTOLIC BLOOD PRESSURE: 55 MMHG | HEART RATE: 75 BPM | SYSTOLIC BLOOD PRESSURE: 103 MMHG | RESPIRATION RATE: 18 BRPM | SYSTOLIC BLOOD PRESSURE: 126 MMHG | SYSTOLIC BLOOD PRESSURE: 109 MMHG | HEART RATE: 73 BPM | SYSTOLIC BLOOD PRESSURE: 114 MMHG | HEART RATE: 78 BPM | HEIGHT: 66 IN | TEMPERATURE: 98 F | DIASTOLIC BLOOD PRESSURE: 70 MMHG | TEMPERATURE: 97.8 F

## 2022-10-21 DIAGNOSIS — K50.90 CROHN'S DISEASE, UNSPECIFIED, WITHOUT COMPLICATIONS: ICD-10-CM

## 2022-10-21 PROCEDURE — 96413 CHEMO IV INFUSION 1 HR: CPT | Performed by: INTERNAL MEDICINE

## 2022-10-21 NOTE — SERVICEHPINOTES
See follow up visit from  8/30/2022   .  brDate / Results of last TB test  6/21/2022   -   Negative  brLabs and/or Additional orders: CBC &amp CMP due in December.brInsurance authorization: Pt has been approved in [employer] for [employer] IV-09/09/22-09/09/24.brPharmacy:  [facility]  brRate of Infusion:  Standard   
br   Infusion order placed on:  9/26/2022   
br

## 2022-11-18 ENCOUNTER — OFFICE (OUTPATIENT)
Dept: URBAN - METROPOLITAN AREA CLINIC 11 | Facility: CLINIC | Age: 22
End: 2022-11-18
Payer: COMMERCIAL

## 2022-11-18 VITALS
HEART RATE: 66 BPM | HEIGHT: 66 IN | DIASTOLIC BLOOD PRESSURE: 67 MMHG | TEMPERATURE: 97.3 F | DIASTOLIC BLOOD PRESSURE: 64 MMHG | RESPIRATION RATE: 18 BRPM | DIASTOLIC BLOOD PRESSURE: 69 MMHG | SYSTOLIC BLOOD PRESSURE: 111 MMHG | HEART RATE: 67 BPM | DIASTOLIC BLOOD PRESSURE: 68 MMHG | SYSTOLIC BLOOD PRESSURE: 105 MMHG | TEMPERATURE: 97.8 F | HEART RATE: 70 BPM

## 2022-11-18 DIAGNOSIS — K50.90 CROHN'S DISEASE, UNSPECIFIED, WITHOUT COMPLICATIONS: ICD-10-CM

## 2022-11-18 PROCEDURE — 96413 CHEMO IV INFUSION 1 HR: CPT | Performed by: INTERNAL MEDICINE

## 2022-11-18 NOTE — SERVICEHPINOTES
See follow up visit from  8/30/2022   .  brDate / Results of last TB test  6/21/2022   -   Negative  brLabs and/or Additional orders: CBC and CMP due in December CMP drawn todaybrInsurance authorization:Pt has been approved in "[employer] Bridge for [place] IV-09/09/22-09/09/24.brPharmacy:  Specialty  brRate of Infusion:  Standard   
br   Infusion order placed on:  9/6/2022   
br

## 2022-12-06 ENCOUNTER — OFFICE (OUTPATIENT)
Dept: URBAN - METROPOLITAN AREA CLINIC 12 | Facility: CLINIC | Age: 22
End: 2022-12-06
Payer: COMMERCIAL

## 2022-12-06 VITALS
WEIGHT: 143 LBS | DIASTOLIC BLOOD PRESSURE: 62 MMHG | SYSTOLIC BLOOD PRESSURE: 115 MMHG | HEIGHT: 66 IN | HEART RATE: 80 BPM | OXYGEN SATURATION: 98 %

## 2022-12-06 DIAGNOSIS — K50.90 CROHN'S DISEASE, UNSPECIFIED, WITHOUT COMPLICATIONS: ICD-10-CM

## 2022-12-06 DIAGNOSIS — K31.5 OBSTRUCTION OF DUODENUM: ICD-10-CM

## 2022-12-06 PROCEDURE — 99214 OFFICE O/P EST MOD 30 MIN: CPT | Performed by: INTERNAL MEDICINE

## 2022-12-06 NOTE — SERVICENOTES
As he is doing better we will continue his current medication therapy.  I will check some basic blood work as above.  Since he is doing better we will also plan for EGD to re-evaluate the stricture and see if any dilation is needed.

## 2022-12-06 NOTE — SERVICEHPINOTES
Mr. Emanuel is a 22-year-old man here for follow-up. The patient started cover the at the end of September 2022 and since then has done very well. He has gained around 20 lb. He states that he is not eating regular food and normal sized meals. He denies any further nausea or vomiting. He denies any constipation, diarrhea, or rectal bleeding. He has not had any problems getting the medication and states that he has not had any side effects. He has not had any signs or symptoms of infection.Previous GI History:brThe patient was initially seen by me in April 2022 because of upper abdominal pain, nausea, and vomiting which started at the beginning of 2022 and was only mild but then became worse starting April 2022. Because of this we placed him on PPI and check basic labs which were all unremarkable except for elevation of his platelets. H pylori breath test was negative. He then came in for colonoscopy about two weeks later and had about 15 lb of weight loss over that time frame with persistent worsening of his nausea vomiting the point where he could keep almost nothing down. EGD was performed which revealed severe diffuse erythema, ulceration, and friability throughout the stomach. Initially there so much inflammation that I thought that he had pyloric stenosis due to ulceration with some other narrowing of the stomach, however it appears, after discussing with Dr. white, who performed EGD in the hospital, that he had inflammation ulceration of the pylorus but that the actual stenosis was in the duodenal bowel, which makes sense. He also had diffuse erythema throughout the rest the stomach. Because of our scope we did recommend he go directly to the hospital as the adult scope could not pass by this strictured area. He underwent CT scan at [facility] which only revealed evidence of gastritis and duodenitis but no evidence of any inflammation elsewhere. He was seen by surgery in case surgery is needed for gastric outlet obstruction with the patient did respond to IV PPI and sucralfate and was tolerating liquid diet and so he was discharged. Of note, gastrin which checked in the hospital and was normal. Biopsies both from here and also at the hospital only revealed evidence of marked had chronic inflammation and granulation tissue. Stains were negative for H pylori as well as lymphoma. Pathology from the hospital EGD again revealed chronic gastritis with no H pylori. When we saw the patient in the middle of May 2022 he was doing relatively well but then a few days later had worsening symptoms and so went back to the hospital where he was seen by one of my associates and was started on prednisone. Of note, when we saw him in clinic in May 2022 his IBD diagnostic panel was significantly elevated with multiple abnormal labs and findings suggestive of Crohn's disease with very high risk of aggressive disease behavior including development of strictures or fistula. The patient underwent colonoscopy while in the hospital which was negative for any evidence of colonic Crohn's disease or disease of the terminal ileum with unremarkable biopsies. He was placed on prednisone and has done well since then. He underwent EGD by me on 6/17/2022 which revealed significant healing of his stomach and duodenal bulb, however there still was significant stricture with inability to pass the regular EGD scope through the duodenal bulb stricture. When we saw the patient in June 2022 he was doing better but still having occasional episode of vomiting. We did have a long discussion with him and his family in regard to treatment of Crohn's disease and I recommended biologic therapy because of his significant stricturing disease and aggressive nature. At that time we discussed other options as well such as mesalamine, Imuran, but these options were not as good. I explained that the other options from the stricture would include surgery but if we can get his inflammation under control we may be able to consider serial dilation. At that time we discussed proceeding with biologic therapy and they wanted to think about it. They initially decided about Skyrzi, however decided to hold off on therapy and try to obtain a second opinion from [facility]. They were never able to get a visit in a close enough time and so returned to start Skyrizi. Of note, the patient is not vaccinated at all and is not interested in any vaccines whatsoever.

## 2022-12-07 LAB
C-REACTIVE PROTEIN, QUANT: 6 MG/L (ref 0–10)
CBC, PLATELET, NO DIFFERENTIAL: HEMATOCRIT: 36.7 % — LOW (ref 37.5–51)
CBC, PLATELET, NO DIFFERENTIAL: HEMOGLOBIN: 11.1 G/DL — LOW (ref 13–17.7)
CBC, PLATELET, NO DIFFERENTIAL: MCH: 24.4 PG — LOW (ref 26.6–33)
CBC, PLATELET, NO DIFFERENTIAL: MCHC: 30.2 G/DL — LOW (ref 31.5–35.7)
CBC, PLATELET, NO DIFFERENTIAL: MCV: 81 FL (ref 79–97)
CBC, PLATELET, NO DIFFERENTIAL: NRBC: (no result)
CBC, PLATELET, NO DIFFERENTIAL: PLATELETS: 297 X10E3/UL (ref 150–450)
CBC, PLATELET, NO DIFFERENTIAL: RBC: 4.54 X10E6/UL (ref 4.14–5.8)
CBC, PLATELET, NO DIFFERENTIAL: RDW: 14.2 % (ref 11.6–15.4)
CBC, PLATELET, NO DIFFERENTIAL: WBC: 6.1 X10E3/UL (ref 3.4–10.8)
COMP. METABOLIC PANEL (14): A/G RATIO: 1.6 (ref 1.2–2.2)
COMP. METABOLIC PANEL (14): ALBUMIN: 4.5 G/DL (ref 4.1–5.2)
COMP. METABOLIC PANEL (14): ALKALINE PHOSPHATASE: 58 IU/L (ref 44–121)
COMP. METABOLIC PANEL (14): ALT (SGPT): 9 IU/L (ref 0–44)
COMP. METABOLIC PANEL (14): AST (SGOT): 13 IU/L (ref 0–40)
COMP. METABOLIC PANEL (14): BILIRUBIN, TOTAL: 0.3 MG/DL (ref 0–1.2)
COMP. METABOLIC PANEL (14): BUN/CREATININE RATIO: 13 (ref 9–20)
COMP. METABOLIC PANEL (14): BUN: 12 MG/DL (ref 6–20)
COMP. METABOLIC PANEL (14): CALCIUM: 9.5 MG/DL (ref 8.7–10.2)
COMP. METABOLIC PANEL (14): CARBON DIOXIDE, TOTAL: 22 MMOL/L (ref 20–29)
COMP. METABOLIC PANEL (14): CHLORIDE: 105 MMOL/L (ref 96–106)
COMP. METABOLIC PANEL (14): CREATININE: 0.91 MG/DL (ref 0.76–1.27)
COMP. METABOLIC PANEL (14): EGFR: 123 ML/MIN/1.73 (ref 59–?)
COMP. METABOLIC PANEL (14): GLOBULIN, TOTAL: 2.8 G/DL (ref 1.5–4.5)
COMP. METABOLIC PANEL (14): GLUCOSE: 98 MG/DL (ref 70–99)
COMP. METABOLIC PANEL (14): POTASSIUM: 4.4 MMOL/L (ref 3.5–5.2)
COMP. METABOLIC PANEL (14): PROTEIN, TOTAL: 7.3 G/DL (ref 6–8.5)
COMP. METABOLIC PANEL (14): SODIUM: 142 MMOL/L (ref 134–144)
SEDIMENTATION RATE-WESTERGREN: 35 MM/HR — HIGH (ref 0–15)

## 2023-06-06 ENCOUNTER — OFFICE (OUTPATIENT)
Dept: URBAN - METROPOLITAN AREA CLINIC 12 | Facility: CLINIC | Age: 23
End: 2023-06-06